# Patient Record
Sex: FEMALE | Race: WHITE | URBAN - METROPOLITAN AREA
[De-identification: names, ages, dates, MRNs, and addresses within clinical notes are randomized per-mention and may not be internally consistent; named-entity substitution may affect disease eponyms.]

---

## 2018-10-02 DIAGNOSIS — N84.0 ENDOMETRIAL POLYP: Primary | ICD-10-CM

## 2018-10-15 ENCOUNTER — TRANSFERRED RECORDS (OUTPATIENT)
Dept: HEALTH INFORMATION MANAGEMENT | Facility: CLINIC | Age: 73
End: 2018-10-15

## 2018-10-23 ENCOUNTER — RADIANT APPOINTMENT (OUTPATIENT)
Dept: ULTRASOUND IMAGING | Facility: CLINIC | Age: 73
End: 2018-10-23
Payer: COMMERCIAL

## 2018-10-23 ENCOUNTER — OFFICE VISIT (OUTPATIENT)
Dept: OBGYN | Facility: CLINIC | Age: 73
End: 2018-10-23
Payer: COMMERCIAL

## 2018-10-23 VITALS
BODY MASS INDEX: 19.66 KG/M2 | WEIGHT: 118 LBS | HEART RATE: 66 BPM | SYSTOLIC BLOOD PRESSURE: 130 MMHG | DIASTOLIC BLOOD PRESSURE: 60 MMHG | HEIGHT: 65 IN

## 2018-10-23 DIAGNOSIS — N84.0 ENDOMETRIAL POLYP: ICD-10-CM

## 2018-10-23 DIAGNOSIS — N84.0 ENDOMETRIAL POLYP: Primary | ICD-10-CM

## 2018-10-23 PROCEDURE — 76830 TRANSVAGINAL US NON-OB: CPT | Performed by: OBSTETRICS & GYNECOLOGY

## 2018-10-23 PROCEDURE — 99213 OFFICE O/P EST LOW 20 MIN: CPT | Performed by: OBSTETRICS & GYNECOLOGY

## 2018-10-23 RX ORDER — ALENDRONATE SODIUM 70 MG/1
TABLET ORAL
Refills: 3 | COMMUNITY
Start: 2018-08-17

## 2018-10-23 NOTE — PROGRESS NOTES
SUBJECTIVE:                                                   Margie Harris is a 73 year old female who presents to clinic today for the following health issue(s):  Patient presents with:  Ultrasound: f/u to endometrial polyp      HPI:  Patient returns today for follow-up.  We have been following a small endometrial polyp for the past several years.  She is asymptomatic.  She will be transferring to a new primary care physician now that Dr. Hinson has retired.    No LMP recorded. Patient is postmenopausal..   Patient is not sexually active, No obstetric history on file..  Using not sexually active for contraception.    reports that she has never smoked. She has never used smokeless tobacco.    STD testing offered?  Declined    Health maintenance updated:  yes    Problem list and histories reviewed & adjusted, as indicated.  Additional history: as documented.    Patient Active Problem List   Diagnosis     Endometrial polyp     No past surgical history on file.   Social History   Substance Use Topics     Smoking status: Never Smoker     Smokeless tobacco: Never Used     Alcohol use 1.8 - 2.4 oz/week     3 - 4 Cans of beer per week           Current Outpatient Prescriptions   Medication Sig     alendronate (FOSAMAX) 70 MG tablet TAKE ONE TABLET BY MOUTH ONCE WEEKLY     Ascorbic Acid (VITAMIN C PO) Take 2,000 mg by mouth daily     Calcium Carbonate (CALTRATE 600 PO) Take by mouth daily     Cholecalciferol (VITAMIN D3 PO) Take 1,000 Units by mouth daily     Multiple Vitamins-Minerals (CENTRUM SILVER) per tablet Take 1 tablet by mouth daily     Omega-3 Fatty Acids (OMEGA-3 FISH OIL PO) Take by mouth daily     vitamin  B complex with vitamin C (VITAMIN  B COMPLEX) TABS Take 1 tablet by mouth daily     No current facility-administered medications for this visit.      Allergies   Allergen Reactions     Contrast Dye Hives     Latex      Clindamycin Hives and Rash       ROS:  12 point review of systems negative  "other than symptoms noted below.  Endocrine: Loss of Hair    OBJECTIVE:     /60  Pulse 66  Ht 5' 5\" (1.651 m)  Wt 118 lb (53.5 kg)  BMI 19.64 kg/m2  Body mass index is 19.64 kg/(m^2).    Exam:  Constitutional:  Appearance: Well nourished, well developed alert, in no acute distress  Chest:  Respiratory Effort:  Breathing unlabored  Neurologic/Psychiatric:  Mental Status:  Oriented X3      In-Clinic Test Results:  Results for orders placed or performed in visit on 10/23/18 (from the past 24 hour(s))   US Transvaginal Non OB    Narrative    Gynecological Ultrasonography:   Uterus: anteflexed  Size: 3.82 x 3.34 x 1.94cm  Findings: Appears normal   Endometrium: Thickness total 1.83mm  Findings: Clear free fluid within endometrium highlights small 1.6mm   hyperechoic defect in the fundal lining.  Right Ovary: Removed. Right pelvic region is clear  Left Ovary: Removed. Left pelvic region is clear  Cul de Sac/Pouch of Raf: no ff      Impression: Normal pelvic ultraosound    Quan Swanson MD              ASSESSMENT/PLAN:                                                        ICD-10-CM    1. Endometrial polyp N84.0            Plan: The small polyp that we have been following has now shrunk to 1.6 mm.  I told her that she does not need any additional follow-up at this time.  If she should have some bleeding or spotting in the future she should return to clinic.    Quan Swanson MD  [facility]  "

## 2018-10-23 NOTE — MR AVS SNAPSHOT
"              After Visit Summary   10/23/2018    Margie Harris    MRN: [id number]           Patient Information     Date Of Birth          1945        Visit Information        Provider Department      10/23/2018 1:30 PM Quan Swanson MD [facility]        Today's Diagnoses     Endometrial polyp    -  1       Follow-ups after your visit        Who to contact     If you have questions or need follow up information about today's clinic visit or your schedule please contact [facility] directly at [phone].  Normal or non-critical lab and imaging results will be communicated to you by Kwarterhart, letter or phone within 4 business days after the clinic has received the results. If you do not hear from us within 7 days, please contact the clinic through Kwarterhart or phone. If you have a critical or abnormal lab result, we will notify you by phone as soon as possible.  Submit refill requests through Ivisys or call your pharmacy and they will forward the refill request to us. Please allow 3 business days for your refill to be completed.          Additional Information About Your Visit        MyChart Information     Ivisys lets you send messages to your doctor, view your test results, renew your prescriptions, schedule appointments and more. To sign up, go to [email or web address] . Click on \"Log in\" on the left side of the screen, which will take you to the Welcome page. Then click on \"Sign up Now\" on the right side of the page.     You will be asked to enter the access code listed below, as well as some personal information. Please follow the directions to create your username and password.     Your access code is: [email or web address]  Expires: 2019  1:59 PM     Your access code will  in 90 days. If you need help or a new code, please call your [place] clinic or [phone].        Care EveryWhere ID     This is your Care EveryWhere ID. This could be used " "by other organizations to access your [facility] medical records  [id number]        Your Vitals Were     Pulse Height BMI (Body Mass Index)             66 5' 5\" (1.651 m) 19.64 kg/m2          Blood Pressure from Last 3 Encounters:   10/23/18 130/60   09/13/16 136/64   09/29/15 116/54    Weight from Last 3 Encounters:   10/23/18 118 lb (53.5 kg)   09/13/16 120 lb (54.4 kg)   09/29/15 120 lb (54.4 kg)              Today, you had the following     No orders found for display       Primary Care Provider    None Specified       No primary provider on file.        Equal Access to Services     FARA SMITH : Korin Shah, nany yap, melissa colvin, tayo morgan . So [facility] [phone].    ATENCIÓN: Si habla español, tiene a wilcox disposición servicios gratuitos de asistencia lingüística. Llame al [phone].    We comply with applicable federal civil rights laws and [place] laws. We do not discriminate on the basis of race, color, national origin, age, disability, sex, sexual orientation, or gender identity.            Thank you!     Thank you for choosing [facility]  for your care. Our goal is always to provide you with excellent care. Hearing back from our patients is one way we can continue to improve our services. Please take a few minutes to complete the written survey that you may receive in the mail after your visit with us. Thank you!             Your Updated Medication List - Protect others around you: Learn how to safely use, store and throw away your medicines at www.disposemymeds.org.          This list is accurate as of 10/23/18  1:59 PM.  Always use your most recent med list.                   Brand Name Dispense Instructions for use Diagnosis    alendronate 70 MG tablet    FOSAMAX     TAKE ONE TABLET BY MOUTH ONCE WEEKLY        CALTRATE 600 PO      Take by mouth daily        CENTRUM SILVER per tablet      Take 1 tablet by " mouth daily        OMEGA-3 FISH OIL PO      Take by mouth daily        vitamin B complex with vitamin C Tabs tablet      Take 1 tablet by mouth daily        VITAMIN C PO      Take 2,000 mg by mouth daily        VITAMIN D3 PO      Take 1,000 Units by mouth daily